# Patient Record
Sex: FEMALE | Race: OTHER | NOT HISPANIC OR LATINO | Employment: FULL TIME | URBAN - METROPOLITAN AREA
[De-identification: names, ages, dates, MRNs, and addresses within clinical notes are randomized per-mention and may not be internally consistent; named-entity substitution may affect disease eponyms.]

---

## 2023-05-01 ENCOUNTER — OFFICE VISIT (OUTPATIENT)
Dept: URGENT CARE | Facility: CLINIC | Age: 28
End: 2023-05-01
Payer: COMMERCIAL

## 2023-05-01 VITALS
SYSTOLIC BLOOD PRESSURE: 135 MMHG | DIASTOLIC BLOOD PRESSURE: 90 MMHG | HEART RATE: 84 BPM | OXYGEN SATURATION: 99 % | TEMPERATURE: 99 F

## 2023-05-01 DIAGNOSIS — R21 RASH: Primary | ICD-10-CM

## 2023-05-01 PROCEDURE — 99203 PR OFFICE/OUTPT VISIT, NEW, LEVL III, 30-44 MIN: ICD-10-PCS | Mod: S$PBB,,, | Performed by: FAMILY MEDICINE

## 2023-05-01 PROCEDURE — 99203 OFFICE O/P NEW LOW 30 MIN: CPT | Mod: S$PBB,,, | Performed by: FAMILY MEDICINE

## 2023-05-01 PROCEDURE — 99204 OFFICE O/P NEW MOD 45 MIN: CPT | Mod: PBBFAC | Performed by: FAMILY MEDICINE

## 2023-05-01 RX ORDER — DESVENLAFAXINE SUCCINATE 50 MG/1
50 TABLET, EXTENDED RELEASE ORAL
COMMUNITY
Start: 2023-04-20

## 2023-05-01 RX ORDER — TRIAMCINOLONE ACETONIDE 1 MG/G
CREAM TOPICAL 2 TIMES DAILY
Qty: 30 G | Refills: 1 | Status: SHIPPED | OUTPATIENT
Start: 2023-05-01

## 2023-05-01 RX ORDER — DROSPIRENONE 4 MG/1
TABLET, FILM COATED ORAL
COMMUNITY
Start: 2022-05-26

## 2023-05-01 RX ORDER — HYDROCHLOROTHIAZIDE 25 MG/1
25 TABLET ORAL EVERY MORNING
COMMUNITY
Start: 2023-03-06

## 2023-05-01 NOTE — PROGRESS NOTES
"Subjective:      Patient ID: Melissa Martinez is a 27 y.o. female.    Vitals:  height is 5' 10" (1.778 m) (pended) and weight is 168.8 kg (372 lb 3.2 oz) (abnormal, pended). Her oral temperature is 98.9 °F (37.2 °C). Her blood pressure is 135/90 (abnormal) and her pulse is 84. Her respiration is 18 (pended) and oxygen saturation is 99%.     Chief Complaint: Rash (Rash to right arm. xSaturday. Rash to left abdomen. )    Rash      Several days of rash right volar upper extremity and right abdominal wall.  Pruritic.  Potential contact after being at a hair salon.    Skin:  Positive for rash.    All other systems are negative  Objective:     Physical Exam   Constitutional: She appears well-developed.  Non-toxic appearance. She does not appear ill. No distress.   HENT:   Head: Atraumatic.   Skin: Skin is warm, dry and not diaphoretic. Capillary refill takes less than 2 seconds.         Comments: Macular papular rash volar aspect of right upper extremity, no vesicles, no ulceration.  Similar changes on right abdominal wall     Assessment:     1. Rash        Plan:       Rash    Other orders  -     triamcinolone acetonide 0.1% (KENALOG) 0.1 % cream; Apply topically 2 (two) times daily. Use as directed  Dispense: 30 g; Refill: 1      Considered potential etiologies, likely contact.  Triamcinolone cream.  Return for recheck if not improving in several days, immediately if any worsening              "

## 2025-06-18 DIAGNOSIS — G43.909 MIGRAINES: Primary | ICD-10-CM
